# Patient Record
Sex: FEMALE | Employment: UNEMPLOYED | URBAN - METROPOLITAN AREA
[De-identification: names, ages, dates, MRNs, and addresses within clinical notes are randomized per-mention and may not be internally consistent; named-entity substitution may affect disease eponyms.]

---

## 2023-04-17 ENCOUNTER — HOSPITAL ENCOUNTER (EMERGENCY)
Facility: HOSPITAL | Age: 48
Discharge: HOME | End: 2023-04-17
Attending: EMERGENCY MEDICINE | Admitting: EMERGENCY MEDICINE
Payer: COMMERCIAL

## 2023-04-17 ENCOUNTER — APPOINTMENT (OUTPATIENT)
Dept: CT IMAGING | Facility: HOSPITAL | Age: 48
End: 2023-04-17
Attending: EMERGENCY MEDICINE

## 2023-04-17 VITALS
RESPIRATION RATE: 20 BRPM | BODY MASS INDEX: 28.99 KG/M2 | HEIGHT: 68 IN | HEART RATE: 94 BPM | SYSTOLIC BLOOD PRESSURE: 187 MMHG | TEMPERATURE: 97.9 F | OXYGEN SATURATION: 100 % | DIASTOLIC BLOOD PRESSURE: 134 MMHG | WEIGHT: 191.28 LBS

## 2023-04-17 DIAGNOSIS — S39.92XA INJURY OF LOW BACK, INITIAL ENCOUNTER: ICD-10-CM

## 2023-04-17 PROCEDURE — 250N000013 HC RX MED GY IP 250 OP 250 PS 637: Performed by: EMERGENCY MEDICINE

## 2023-04-17 PROCEDURE — 72192 CT PELVIS W/O DYE: CPT

## 2023-04-17 PROCEDURE — 99284 EMERGENCY DEPT VISIT MOD MDM: CPT | Mod: 25

## 2023-04-17 PROCEDURE — 72131 CT LUMBAR SPINE W/O DYE: CPT

## 2023-04-17 RX ORDER — OXYCODONE HYDROCHLORIDE 5 MG/1
5 TABLET ORAL ONCE
Status: COMPLETED | OUTPATIENT
Start: 2023-04-17 | End: 2023-04-17

## 2023-04-17 RX ORDER — OXYCODONE AND ACETAMINOPHEN 5; 325 MG/1; MG/1
1 TABLET ORAL EVERY 6 HOURS PRN
Qty: 12 TABLET | Refills: 0 | Status: SHIPPED | OUTPATIENT
Start: 2023-04-17 | End: 2023-04-20

## 2023-04-17 RX ADMIN — OXYCODONE HYDROCHLORIDE 5 MG: 5 TABLET ORAL at 09:28

## 2023-04-17 NOTE — DISCHARGE INSTRUCTIONS
The CT scan of your lower back and pelvis did not show any acute injury  I recommend the 1000 mg of Tylenol and 400 mg of Motrin 3 times a day for pain  I have prescribed Percocet for breakthrough pain  Your pain should start to improve within a week  If you have ongoing symptoms or your symptoms change return the emergency department

## 2023-04-17 NOTE — ED TRIAGE NOTES
Patient slipped and fell this morning outside on the ice at 0530. Patient landed on her back. Patient reports lower left back pain that radiates down the left buttock. Patient also reports some left elbow tenderness.      Triage Assessment     Row Name 04/17/23 0701       Triage Assessment (Adult)    Airway WDL WDL       Respiratory WDL    Respiratory WDL WDL       Skin Circulation/Temperature WDL    Skin Circulation/Temperature WDL WDL       Cardiac WDL    Cardiac WDL WDL       Peripheral/Neurovascular WDL    Peripheral Neurovascular WDL WDL       Cognitive/Neuro/Behavioral WDL    Cognitive/Neuro/Behavioral WDL WDL

## 2023-04-17 NOTE — ED PROVIDER NOTES
EMERGENCY DEPARTMENT ENCOUNTER            IMPRESSION:  Lower back and pelvic pain from a fall      MEDICAL DECISION MAKING:  It was my pleasure to provide care for Brinda Sparks who presented for evaluation of lower back and pelvic pain from a fall    On my exam patient is pleasant and cooperative.  There is evidence of distress.  Vital signs show elevated blood pressure.  Physical exam notable for tenderness of the sacral region.     Pain medication administered for symptom relief.  Patient's symptoms improved.     CT imaging of the pelvis and lumbar spine did not show any acute finding.  Imaging independently reviewed and agree with radiologist findings.     ED evaluation is consistent with soft tissue injury of the pelvis    Patient was reevaluated and results were discussed.  I recommended prescription pain medication and outpatient follow-up.      Prior to making a final disposition on this patient the results of patient's tests and other diagnostic studies were discussed with the patient. All questions were answered. Patient expressed understanding of the plan and was amenable.   Return precautions and follow-up were discussed.     The outpatient pharmacy contacted me for confirmation of the opiate prescription.  Apparently patient has been prescribed enough opiate pain medication to cover her most recent illness      =================================================================  CHIEF COMPLAINT:  Chief Complaint   Patient presents with     Fall     Back Pain         HPI  Brinda Sparks is a 47 year old female with a history of hypertension and back pain who presents to the ED by walk in for evaluation of tailbone pain following a mechanical fall.     Patient reports that this morning around 5:30 AM she was walking outside when she slipped and fell on ice. She landed on her tailbone and endorses continued pain. Her pain is exacerbated and starts to radiate down when sitting but improved when she stands.  "Patient also hit her left elbow when she fell but denies hitting her head. She endorses mild left elbow soreness. Patient denies any abdominal pain. No other complaints or concerns expressed at this time.       REVIEW OF SYSTEMS   Constitutional: Does not report chills, unintentional weight loss or fatigue   Eyes: Does not report visual changes or discharge    HENT: Does not report sore throat, ear pain or neck pain  Respiratory: Does not report cough or shortness of breath    Cardiovascular: Does not report chest pain, palpitations or leg swelling  GI: Does not report abdominal pain, nausea, vomiting, or dark, bloody stools.    : Does not report hematuria, dysuria, or flank pain  Musculoskeletal: Endorses tailbone pain and mild left elbow pain.   Skin: Does not report rash or wound  Neurologic: Does not report current headache, new weakness, focal weakness, or sensory changes        Remainder of systems reviewed, unless noted in HPI all others negative.      PAST MEDICAL HISTORY:  History reviewed. No pertinent past medical history.    PAST SURGICAL HISTORY:  History reviewed. No pertinent surgical history.      CURRENT MEDICATIONS:    oxyCODONE-acetaminophen (PERCOCET) 5-325 MG tablet  ALBUTEROL INHALER 17GM  Fluticasone Propionate, Inhal, (FLOVENT IN)  PREDNISONE PO        ALLERGIES:  Allergies   Allergen Reactions     Ibuprofen GI Disturbance and Nausea and Vomiting     Naproxen      Abdominal pain     Adhesive Tape Rash       FAMILY HISTORY:  History reviewed. No pertinent family history.    SOCIAL HISTORY:   Social History     Socioeconomic History     Marital status: Single       PHYSICAL EXAM:    BP (!) 187/134   Pulse 94   Temp 97.9  F (36.6  C) (Temporal)   Resp 20   Ht 1.727 m (5' 8\")   Wt 86.8 kg (191 lb 4.5 oz)   SpO2 100%   BMI 29.08 kg/m      General Presentation: She appears uncomfortable  Head: Atraumatic  Pulmonary: Spontaneous respiration. No respiratory distress. Clear equal breath " sounds. Airway patent. Speech is normal. No stridor. Grossly stable chest wall. No crepitus. No chest wall tenderness to palpation noted.  Circulatory: Regular rate and rhythm. No murmurs, rubs, or gallops. Normal capillary refill.   Chest wall: No gross abnormality, no tenderness deformity or swelling  Abdominal: Abdomen soft, non-tender and non-distended. No peritoneal signs. No flank tenderness. Normal bowel sounds. Pelvis stable/non-tender.   Neurologic: Alert and awake. Cranial nerves II-XII grossly intact.  No gross motor deficit. No gross sensory deficit. Normal upper extremity and lower extremity strength. Juan Carlos Coma Score 15.  Spine: No bony tenderness to palpation in the cervical spine, thoracic spine, tenderness to palpation of the lower sacrum  Upper extremities:  Full range of motion. No tenderness to palpation.  Pulses 2/4 bilateral upper extremities . No wounds, abrasions, lacerations, or contusions noted.   Lower extremities:  Full range of motion. No tenderness to palpation.  Pulses 2/4 bilateral lower extremities . No wounds, abrasions, lacerations, or contusions noted.   Skin: Head/scalp non tender. No wounds, abrasions, lacerations, or contusions of head/scalp, chest, back, abdomen, flank or pelvis.       ED COURSE:  8:07 AM I met with the patient and her daughter to gather history and to perform my initial exam. We discussed plans for the ED course, including diagnostic testing and treatment. PPE: surgical mask and gloves  10:53 AM I rechecked and updated the patient. We discussed the plan for discharge and the patient is agreeable. Reviewed supportive cares, symptomatic treatment, outpatient follow up, and reasons to return to the Emergency Department. Patient to be discharged by ED RN.       Medical Decision Making    History:    Supplemental history from: Family    External Record(s) reviewed: External medical records including care everywhere reviewed    Work Up:    Imaging studies as  ordered were independently reviewed by the provider    Broad differential diagnosis considered for multiple trauma    External consultation:    Discussion of management with another provider: Pain medication discussed with outpatient    Complicating factors:    Patient has a complicated past medical history including hypertension    Care affected by social determinants of health: Access to primary care    Disposition involved shared decision-making with the patient.    Admission not indicated.    Prescription medication prescribed.  Patient otherwise to continue outpatient medications as prescribed.       LAB:  Laboratory results were independently reviewed and interpreted  Results for orders placed or performed during the hospital encounter of 04/17/23   Lumbar spine CT w/o contrast    Impression    IMPRESSION:  1.  No fracture or posttraumatic subluxation.  2.  No high-grade spinal canal or neural foraminal stenosis.   CT Pelvis Bone wo Contrast    Impression    IMPRESSION:  1.  No acute pelvic or proximal femur fracture is identified.  2.  Mild degenerative changes in both hip joints.  3.  The lower lumbar spine is better evaluated on the dedicated lumbar spine CT from the same day, please see that report for further details.         RADIOLOGY:  Radiology reports were independently reviewed and interpreted  CT Pelvis Bone wo Contrast   Final Result   IMPRESSION:   1.  No acute pelvic or proximal femur fracture is identified.   2.  Mild degenerative changes in both hip joints.   3.  The lower lumbar spine is better evaluated on the dedicated lumbar spine CT from the same day, please see that report for further details.      Lumbar spine CT w/o contrast   Final Result   IMPRESSION:   1.  No fracture or posttraumatic subluxation.   2.  No high-grade spinal canal or neural foraminal stenosis.             MEDICATIONS GIVEN IN THE EMERGENCY:  Medications   oxyCODONE (ROXICODONE) tablet 5 mg (5 mg Oral $Given 4/17/23  0928)           NEW PRESCRIPTIONS STARTED AT TODAY'S ER VISIT:  Discharge Medication List as of 4/17/2023 11:26 AM      START taking these medications    Details   oxyCODONE-acetaminophen (PERCOCET) 5-325 MG tablet Take 1 tablet by mouth every 6 hours as needed for pain, Disp-12 tablet, R-0, Local Print                      FINAL DIAGNOSIS:    ICD-10-CM    1. Injury of low back, initial encounter  S39.92XA                  NAME: Brinda Sparks  AGE: 47 year old female  YOB: 1975  MRN: [id number]  EVALUATION DATE & TIME: No admission date for patient encounter.    PCP: No primary care provider on file.    ED PROVIDER: Frank Thorpe M.D.      Zakia CAMPOS, am serving as a scribe to document services personally performed by Dr. Frank Thorpe based on my observation and the provider's statements to me. IFrank MD attest that Zakia Gibson is acting in a scribe capacity, has observed my performance of the services and has documented them in accordance with my direction.    Frank Thorpe M.D.  Emergency Medicine  [facility] EMERGENCY DEPARTMENT  [facility] [facility] [id number] [phone]  Dept: [phone]  4/17/2023       Frank Thorpe MD  04/17/23 5033

## 2023-05-04 ENCOUNTER — TRANSCRIBE ORDERS (OUTPATIENT)
Dept: OTHER | Age: 48
End: 2023-05-04

## 2023-05-04 DIAGNOSIS — C84.A0 CUTANEOUS T-CELL LYMPHOMA, UNSPECIFIED BODY REGION (H): Primary | ICD-10-CM
